# Patient Record
Sex: FEMALE | Race: WHITE | NOT HISPANIC OR LATINO | URBAN - METROPOLITAN AREA
[De-identification: names, ages, dates, MRNs, and addresses within clinical notes are randomized per-mention and may not be internally consistent; named-entity substitution may affect disease eponyms.]

---

## 2018-04-05 ENCOUNTER — TRANSCRIBE ORDERS (OUTPATIENT)
Dept: ADMINISTRATIVE | Facility: HOSPITAL | Age: 43
End: 2018-04-05

## 2018-04-05 DIAGNOSIS — Z12.31 VISIT FOR SCREENING MAMMOGRAM: Primary | ICD-10-CM

## 2018-04-19 ENCOUNTER — HOSPITAL ENCOUNTER (OUTPATIENT)
Dept: MAMMOGRAPHY | Facility: HOSPITAL | Age: 43
Discharge: HOME | End: 2018-04-19
Admitting: PHYSICIAN ASSISTANT

## 2018-04-19 DIAGNOSIS — Z12.31 VISIT FOR SCREENING MAMMOGRAM: ICD-10-CM

## 2018-04-19 PROCEDURE — 77063 BREAST TOMOSYNTHESIS BI: CPT | Performed by: RADIOLOGY

## 2018-04-19 PROCEDURE — 77067 SCR MAMMO BI INCL CAD: CPT

## 2018-04-19 PROCEDURE — 77067 SCR MAMMO BI INCL CAD: CPT | Performed by: RADIOLOGY

## 2018-04-19 PROCEDURE — 77063 BREAST TOMOSYNTHESIS BI: CPT

## 2022-04-22 ENCOUNTER — OFFICE VISIT (OUTPATIENT)
Dept: ORTHOPEDIC SURGERY | Facility: CLINIC | Age: 47
End: 2022-04-22

## 2022-04-22 VITALS
SYSTOLIC BLOOD PRESSURE: 138 MMHG | WEIGHT: 130.07 LBS | DIASTOLIC BLOOD PRESSURE: 98 MMHG | HEIGHT: 69 IN | BODY MASS INDEX: 19.27 KG/M2

## 2022-04-22 DIAGNOSIS — S52.615A CLOSED NONDISPLACED FRACTURE OF STYLOID PROCESS OF LEFT ULNA, INITIAL ENCOUNTER: Primary | ICD-10-CM

## 2022-04-22 PROCEDURE — 99204 OFFICE O/P NEW MOD 45 MIN: CPT | Performed by: ORTHOPAEDIC SURGERY

## 2022-04-22 RX ORDER — IBUPROFEN 200 MG
200 TABLET ORAL EVERY 6 HOURS PRN
COMMUNITY

## 2022-04-22 NOTE — PROGRESS NOTES
"      [facility] Orthopaedic Surgery Clinic Note    Subjective     CC: Pain of the Left Wrist (DOI: 4/18/22)      HPI  Deepthi Bustamante is a 46 y.o. female who presents with new problem of: left wrist pain.  Onset: mechanical fall. The issue has been ongoing for 4 day(s). Pain is a 6/10 on the pain scale. Pain is described as dull, burning, throbbing and shooting. Associated symptoms include pain and swelling. The pain is worse with any movement of the joint; resting improve the pain. Previous treatments have included: bracing and NSAIDS.    I have reviewed the following portions of the patient's history:History of Present Illness and review of systems.    She fell 4 days ago injuring her left wrist.  She had a normal solid fracture.  She was seen in urgent care and referred to me.  She is a  and she works with horses.  Review of Systems   Constitutional: Negative.    HENT: Negative.    Eyes: Negative.    Respiratory: Negative.    Cardiovascular: Negative.    Gastrointestinal: Negative.    Endocrine: Negative.    Genitourinary: Negative.    Musculoskeletal: Positive for arthralgias.   Skin: Negative.    Allergic/Immunologic: Negative.    Neurological: Negative.    Hematological: Negative.    Psychiatric/Behavioral: Negative.        ROS:    Constiutional:Pt denies fever, chills, nausea, or vomiting.  MSK:as above      Objective      Past Medical History  History reviewed. No pertinent past medical history.      Physical Exam  /98   Ht 175.3 cm (69.02\")   Wt 59 kg (130 lb 1.1 oz)   BMI 19.20 kg/m²     Body mass index is 19.2 kg/m².    Patient is well nourished and well developed.        Ortho Exam  Left wrist tender ulnar styloid.  No snuffbox tenderness.  No other pain or swelling    Imaging/Labs/EMG Reviewed:  Imaging Results (Last 24 Hours)     ** No results found for the last 24 hours. **        Viewed and personally interpreted x-rays May 21 as a normal styloid fracture " nondisplaced.    Assessment:  1. Closed nondisplaced fracture of styloid process of left ulna, initial encounter        Plan:  1. Recommend over the counter anti-inflammatories for pain and/or swelling  2. She was placed in Exos brace to work as a removable cast.  Follow-up in 3 weeks with x-rays.    Follow Up:   No follow-ups on file.      Medical Decision Making  Management Options : Moderate - 1 Acute Complicated Injury         Fabrizio Brennan M.D., [facility]  Orthopedic Surgeon  Fellowship Trained Sports Medicine  [facility]  Orthopedics and Sports Medicine  [address] [place] [address], [address]  [place] [address]    EMR Dragon/Transcription disclaimer:  Much of this encounter note is an electronic transcription of spoken language to printed text. Electronic transcription of spoken language may permit erroneous, or at times, nonsensical words or phrases to be inadvertently transcribed. Although I have reviewed the note for such errors, some may still exist.

## 2022-05-18 ENCOUNTER — OFFICE VISIT (OUTPATIENT)
Dept: ORTHOPEDIC SURGERY | Facility: CLINIC | Age: 47
End: 2022-05-18

## 2022-05-18 VITALS
BODY MASS INDEX: 19.27 KG/M2 | SYSTOLIC BLOOD PRESSURE: 116 MMHG | HEIGHT: 69 IN | WEIGHT: 130.07 LBS | DIASTOLIC BLOOD PRESSURE: 66 MMHG

## 2022-05-18 DIAGNOSIS — S52.615D CLOSED NONDISPLACED FRACTURE OF STYLOID PROCESS OF LEFT ULNA WITH ROUTINE HEALING, SUBSEQUENT ENCOUNTER: Primary | ICD-10-CM

## 2022-05-18 PROCEDURE — 99213 OFFICE O/P EST LOW 20 MIN: CPT | Performed by: ORTHOPAEDIC SURGERY

## 2022-05-18 NOTE — PROGRESS NOTES
"      [facility] Orthopaedic Surgery Clinic Note    Subjective     CC: Follow-up (1 month f/u - Closed nondisplaced fracture of styloid process of left ulna//)      HPI    Deepthi Bustamante is a 46 y.o. female.  She is doing well no complaints.  Her injury was April 18.    Review of Systems   Constitutional: Negative.    HENT: Negative.    Eyes: Negative.    Respiratory: Negative.    Cardiovascular: Negative.    Gastrointestinal: Negative.    Endocrine: Negative.    Genitourinary: Negative.    Musculoskeletal: Positive for arthralgias.   Skin: Positive for rash.   Allergic/Immunologic: Negative.    Neurological: Negative.    Hematological: Negative.    Psychiatric/Behavioral: Negative.        ROS:    Constiutional:Pt denies fever, chills, nausea, or vomiting.  MSK:as above      Objective      Past Medical History  History reviewed. No pertinent past medical history.      Physical Exam  /66   Ht 175.3 cm (69.02\")   Wt 59 kg (130 lb 1.1 oz)   BMI 19.20 kg/m²     Body mass index is 19.2 kg/m².    Patient is well nourished and well developed.        Ortho Exam  Left wrist tender ulnar styloid.  Minimal swelling.    Imaging/Labs/EMG Reviewed:  Imaging Results (Last 24 Hours)     Procedure Component Value Units Date/Time    XR Wrist 3+ View Left [[id number]] Resulted: 05/18/22 0954     Updated: 05/18/22 0955    Narrative:      Left Wrist X-Ray  Indication: Pain  AP, Lateral, and Oblique views    Findings:  Healing ulnar styloid fracture  No bony lesion  Normal soft tissues  Normal joint spaces    prior studies were available for comparison.          No valid procedures specified.     Assessment:  1. Closed nondisplaced fracture of styloid process of left ulna with routine healing, subsequent encounter        Plan:  1. Recommend over the counter anti-inflammatories for pain and/or swelling  2. She is doing well.  Continue brace for total of 6 weeks.  Follow-up in 3 weeks with an x-ray    Follow Up:   Return in about 3 " weeks (around 6/8/2022) for x-ray.      Medical Decision Making  Management Options : Low - OTC Drugs        Fabrizio Brennan M.D., [facility]  Orthopedic Surgeon  Fellowship Trained Sports Medicine  [facility]  Orthopedics and Sports Medicine  [facility] [facility], [address]  [place] [address]    EMR Dragon/Transcription disclaimer:  Much of this encounter note is an electronic transcription of spoken language to printed text. Electronic transcription of spoken language may permit erroneous, or at times, nonsensical words or phrases to be inadvertently transcribed. Although I have reviewed the note for such errors, some may still exist.

## 2022-06-13 ENCOUNTER — OFFICE VISIT (OUTPATIENT)
Dept: ORTHOPEDIC SURGERY | Facility: CLINIC | Age: 47
End: 2022-06-13

## 2022-06-13 VITALS
BODY MASS INDEX: 19.26 KG/M2 | SYSTOLIC BLOOD PRESSURE: 120 MMHG | WEIGHT: 130 LBS | HEIGHT: 69 IN | DIASTOLIC BLOOD PRESSURE: 90 MMHG

## 2022-06-13 DIAGNOSIS — M54.42 ACUTE BILATERAL LOW BACK PAIN WITH BILATERAL SCIATICA: ICD-10-CM

## 2022-06-13 DIAGNOSIS — M54.41 ACUTE BILATERAL LOW BACK PAIN WITH BILATERAL SCIATICA: ICD-10-CM

## 2022-06-13 DIAGNOSIS — S52.615D CLOSED NONDISPLACED FRACTURE OF STYLOID PROCESS OF LEFT ULNA WITH ROUTINE HEALING, SUBSEQUENT ENCOUNTER: ICD-10-CM

## 2022-06-13 DIAGNOSIS — Z09 FRACTURE FOLLOW-UP: Primary | ICD-10-CM

## 2022-06-13 PROCEDURE — 99213 OFFICE O/P EST LOW 20 MIN: CPT | Performed by: ORTHOPAEDIC SURGERY

## 2022-06-13 NOTE — PROGRESS NOTES
"      [facility] Orth[facility] Surgery Clinic Note    Subjective     CC: Follow-up (4 week follow up -- Closed nondisplaced fracture of styloid process of left ulna )      HANNAH Bustamante is a 46 y.o. female.  Her wrist is doing better.  She is more than 6 weeks out from an injury.  New complaint is back pain that radiates to both thighs.  She had this happen about a year ago. she is taking Advil.    Review of Systems   Musculoskeletal: Positive for arthralgias.   All other systems reviewed and are negative.      ROS:    Constiutional:Pt denies fever, chills, nausea, or vomiting.  MSK:as above      Objective      Past Medical History  History reviewed. No pertinent past medical history.      Physical Exam  /90   Ht 175.3 cm (69.02\")   Wt 59 kg (130 lb)   BMI 19.19 kg/m²     Body mass index is 19.19 kg/m².    Patient is well nourished and well developed.        Ortho Exam  Left wrist with no swelling no tenderness.  She has limitation of full supination.  Negative straight leg raise motor sensor intact lower extremities    Imaging/Labs/EMG Reviewed:  Imaging Results (Last 24 Hours)     Procedure Component Value Units Date/Time    XR Wrist 3+ View Left [[id number]] Resulted: 06/13/22 1043     Updated: 06/13/22 1043    Narrative:      Left Wrist X-Ray  Indication: Pain  AP, Lateral, and Oblique views    Findings:  Healing left ulnar styloid fracture  No bony lesion  Normal soft tissues  Normal joint spaces    prior studies were available for comparison.          No valid procedures specified.     Assessment:  1. Fracture follow-up    2. Closed nondisplaced fracture of styloid process of left ulna with routine healing, subsequent encounter    3. Acute bilateral low back pain with bilateral sciatica        Plan:  1. Recommend over the counter anti-inflammatories for pain and/or swelling  2. She will do home exercise program for wrist.  3. I ordered physical therapy for her back.  If her back does not get better " she will see Dr. Thompson in [place].  4. Follow-up in a month with x-rays left wrist.    Follow Up:   Return in about 1 month (around 7/13/2022) for x-ray.      Medical Decision Making  Management Options : Low - OT or PT Therapy         Fabrizio Brennan M.D., [facility]  Orthopedic Surgeon  Fellowship Trained Sports Medicine  [facility]  Orthopedics and Sports Medicine  [facility] [facility], [address]  [place] [address]    EMR Dragon/Transcription disclaimer:  Much of this encounter note is an electronic transcription of spoken language to printed text. Electronic transcription of spoken language may permit erroneous, or at times, nonsensical words or phrases to be inadvertently transcribed. Although I have reviewed the note for such errors, some may still exist.

## 2022-10-27 ENCOUNTER — TRANSCRIBE ORDERS (OUTPATIENT)
Dept: ADMINISTRATIVE | Facility: HOSPITAL | Age: 47
End: 2022-10-27

## 2022-10-27 DIAGNOSIS — Z12.31 SCREENING MAMMOGRAM FOR BREAST CANCER: Primary | ICD-10-CM

## 2022-11-09 ENCOUNTER — HOSPITAL ENCOUNTER (OUTPATIENT)
Dept: MAMMOGRAPHY | Facility: HOSPITAL | Age: 47
Discharge: HOME | End: 2022-11-09
Admitting: NURSE PRACTITIONER

## 2022-11-09 DIAGNOSIS — Z12.31 SCREENING MAMMOGRAM FOR BREAST CANCER: ICD-10-CM

## 2022-11-09 PROCEDURE — 77063 BREAST TOMOSYNTHESIS BI: CPT | Performed by: RADIOLOGY

## 2022-11-09 PROCEDURE — 77063 BREAST TOMOSYNTHESIS BI: CPT

## 2022-11-09 PROCEDURE — 77067 SCR MAMMO BI INCL CAD: CPT

## 2022-11-09 PROCEDURE — 77067 SCR MAMMO BI INCL CAD: CPT | Performed by: RADIOLOGY

## 2024-04-24 ENCOUNTER — TRANSCRIBE ORDERS (OUTPATIENT)
Dept: ADMINISTRATIVE | Facility: HOSPITAL | Age: 49
End: 2024-04-24
Payer: COMMERCIAL

## 2024-04-24 DIAGNOSIS — Z12.31 SCREENING MAMMOGRAM FOR BREAST CANCER: Primary | ICD-10-CM

## 2024-05-29 ENCOUNTER — HOSPITAL ENCOUNTER (OUTPATIENT)
Dept: MAMMOGRAPHY | Facility: HOSPITAL | Age: 49
Discharge: HOME | End: 2024-05-29
Admitting: NURSE PRACTITIONER
Payer: COMMERCIAL

## 2024-05-29 DIAGNOSIS — Z12.31 SCREENING MAMMOGRAM FOR BREAST CANCER: ICD-10-CM

## 2024-05-29 PROCEDURE — 77063 BREAST TOMOSYNTHESIS BI: CPT

## 2024-05-29 PROCEDURE — 77067 SCR MAMMO BI INCL CAD: CPT
